# Patient Record
(demographics unavailable — no encounter records)

---

## 2024-10-08 NOTE — SURGICAL HISTORY
[de-identified] : 10/25/2022: Delayed BL breast recon with Mary Ann 300cc prepec [de-identified] : 02/09/2023: Bilateral delayed PAP flap breast reconstruction TE removal [de-identified] : 06/16/2023: bilateral breast reconstruction revision, nipple reconstruction, fat grafting from thighs and scar revision  [de-identified] : 02/23/2024: revision breast reconstruction with fat grafting from flanks, hips, and thighs

## 2024-10-08 NOTE — ASSESSMENT
[FreeTextEntry1] : Jazz is now 8 months out from her revision surgery we had previously discussed possible implants placement to augment the size and improve the shape and contour she would like to proceed with revision surgery with implants we discussed possible reinforcement with gala flex mesh however this may not be necessary we will have it in the room available and we also discussed fat grafting to the upper pole from the flank region.  We can proceed with coordinating a date for surgery at her convenience.  I reviewed the risks benefits and alternatives of the operation she previously had cosmetic breast implants prior to her mastectomy that were 350 to 400 cc range she does not remember exactly what size they were but she does not want to be big:  BL Implant placement ~225-300cc implants possible fat grafting from flanks Possible Galaflex mesh reinforcement

## 2024-10-08 NOTE — PHYSICAL EXAM
[de-identified] : Exam over video conference only she appears to be well healed, the contour is significantly improved the breast mounds have settled there is more natural appearing lower pole fullness and contour

## 2025-02-05 NOTE — END OF VISIT
[Time Spent: ___ minutes] : I have spent [unfilled] minutes of time on the encounter which excludes teaching and separately reported services. Cardiology post cath note/Event Note

## 2025-02-05 NOTE — HISTORY OF PRESENT ILLNESS
Detail Level: Zone Quality 265: Biopsy Follow-Up: Biopsy results reviewed, communicated, tracked, and documented [FreeTextEntry1] : Patient Name: MARICARMEN GIVENS : May 31 1982 Date: 2025 Attending: Dr. Oren Lerman   HPI: follow up appointment for bilateral breast reconstruction revision with implant placement and fat grafting   Allergies: PCN Medication prescribed: ibuprofen 600 mg, oxycodone 5 mg, valium 5 mg   ROS: complete 14 point review of systems negative except pertinent items reviewed in the HPI. Other non-contributory items reviewed in our new patient questionnaire and we have submitted it to be scanned into the medical record.   Physical Exam: completed at time of in office evaluation   Assessment/Plan: We have discussed pre and postop instructions, recovery limitations, restrictions and expectations, ERAS protocol, NPO status, transportation home, postop medications, LISETTE drains, compression garments, benefits and risks of the procedure. Pre-op labs reviewed. Patient counseled on interactions between prescribed medication and current Rx for trazadone, and acknowledged understanding. She will consult with PCP as well. The patient would like to proceed with surgery as scheduled.   BRITTNY LORA NP

## 2025-02-18 NOTE — PHYSICAL EXAM
[de-identified] : Incisions c/d/i, no collections or s/sx of infection, port site sutures removed, goodcontour and symmetry [de-identified] : liposuction donor site healing well with good contour mild bruising, no collections

## 2025-02-18 NOTE — HISTORY OF PRESENT ILLNESS
[FreeTextEntry1] : 41 y/o female presents 4 days s/p revision bilateral breast reconstruction, bilateral breast implant placement, reinforcement with galaflex mesh and fat grafting for flanks on 02/14/2025. Denies any f/c/n/v/. Patient is taking Percocet, ibuprofen, and valium for pain, reports improvement today.

## 2025-02-18 NOTE — PHYSICAL EXAM
[de-identified] : Incisions c/d/i, no collections or s/sx of infection, port site sutures removed, goodcontour and symmetry [de-identified] : liposuction donor site healing well with good contour mild bruising, no collections

## 2025-02-18 NOTE — ASSESSMENT
[FreeTextEntry1] : 4 days PO b/l breast recon revision with implant placement She is very happy with her results Scar management reviewed  She will schedule telehealth in 4-6 weeks Return in person if healing well in 6 months

## 2025-02-18 NOTE — SURGICAL HISTORY
[de-identified] : 10/25/2022: Delayed BL breast recon with Mary Ann 300cc prepec [de-identified] : 02/09/2023: Bilateral delayed PAP flap breast reconstruction TE removal [de-identified] : 06/16/2023: bilateral breast reconstruction revision, nipple reconstruction, fat grafting from thighs and scar revision  [de-identified] : 02/23/2024: revision breast reconstruction with fat grafting from flanks, hips, and thighs [de-identified] : 02/14/2025: revision bilateral breast reconstruction, bilateral breast implant placement, reinforcement with galaflex mesh, and fat grafting from flanks R [id number], L

## 2025-02-18 NOTE — PHYSICAL EXAM
[de-identified] : Incisions c/d/i, no collections or s/sx of infection, port site sutures removed, goodcontour and symmetry [de-identified] : liposuction donor site healing well with good contour mild bruising, no collections

## 2025-02-18 NOTE — SURGICAL HISTORY
[de-identified] : 10/25/2022: Delayed BL breast recon with Mary Ann 300cc prepec [de-identified] : 02/09/2023: Bilateral delayed PAP flap breast reconstruction TE removal [de-identified] : 06/16/2023: bilateral breast reconstruction revision, nipple reconstruction, fat grafting from thighs and scar revision  [de-identified] : 02/23/2024: revision breast reconstruction with fat grafting from flanks, hips, and thighs [de-identified] : 02/14/2025: revision bilateral breast reconstruction, bilateral breast implant placement, reinforcement with galaflex mesh, and fat grafting from flanks R [id number], L

## 2025-02-18 NOTE — SURGICAL HISTORY
[de-identified] : 10/25/2022: Delayed BL breast recon with Mary Ann 300cc prepec [de-identified] : 02/09/2023: Bilateral delayed PAP flap breast reconstruction TE removal [de-identified] : 06/16/2023: bilateral breast reconstruction revision, nipple reconstruction, fat grafting from thighs and scar revision  [de-identified] : 02/23/2024: revision breast reconstruction with fat grafting from flanks, hips, and thighs [de-identified] : 02/14/2025: revision bilateral breast reconstruction, bilateral breast implant placement, reinforcement with galaflex mesh, and fat grafting from flanks R [id number], L

## 2025-03-31 NOTE — ASSESSMENT
[FreeTextEntry1] : well healed She is very happy with her results Scar management reviewed  she can proceed with NAC tattoo when she is ~ 3 months PO (nipple already healed previously) Return in person in 6 months

## 2025-03-31 NOTE — REASON FOR VISIT
[Home] : at home, [unfilled] , at the time of the visit. [Medical Office: ([facility])___] : at the medical office located in  [Telehealth (audio & video)] : This visit was provided via telehealth using real-time 2-way audio visual technology. [Verbal consent obtained from patient] : the patient, [unfilled] [FreeTextEntry1] : Self [Post Op: _________] : a [unfilled] post op visit

## 2025-03-31 NOTE — HISTORY OF PRESENT ILLNESS
[FreeTextEntry1] : 41 y/o female presents 6 weeks s/p revision bilateral breast reconstruction, bilateral breast implant placement, reinforcement with galaflex mesh and fat grafting for flanks on 02/14/2025. Denies any f/c/n/v/. Pshe is very happy with the results. No complaints. Wants to know when she can do NAC tattoo

## 2025-03-31 NOTE — SURGICAL HISTORY
[de-identified] : 02/09/2023: Bilateral delayed PAP flap breast reconstruction TE removal [de-identified] : 10/25/2022: Delayed BL breast recon with Mary Ann 300cc prepec [de-identified] : 06/16/2023: bilateral breast reconstruction revision, nipple reconstruction, fat grafting from thighs and scar revision  [de-identified] : 02/23/2024: revision breast reconstruction with fat grafting from flanks, hips, and thighs [de-identified] : 02/14/2025: revision bilateral breast reconstruction, bilateral breast implant placement, reinforcement with galaflex mesh, and fat grafting from flanks R [id number], L